# Patient Record
Sex: MALE | URBAN - METROPOLITAN AREA
[De-identification: names, ages, dates, MRNs, and addresses within clinical notes are randomized per-mention and may not be internally consistent; named-entity substitution may affect disease eponyms.]

---

## 2020-03-06 ENCOUNTER — OFFICE VISIT (OUTPATIENT)
Dept: URBAN - METROPOLITAN AREA CLINIC 72 | Facility: CLINIC | Age: 35
End: 2020-03-06
Payer: COMMERCIAL

## 2020-03-06 DIAGNOSIS — H52.13 MYOPIA, BILATERAL: Primary | ICD-10-CM

## 2020-03-06 PROCEDURE — 92002 INTRM OPH EXAM NEW PATIENT: CPT | Performed by: OPTOMETRIST

## 2020-03-06 ASSESSMENT — VISUAL ACUITY
OS: 20/20
OD: 20/20

## 2021-09-23 ENCOUNTER — OFFICE VISIT (OUTPATIENT)
Dept: URBAN - METROPOLITAN AREA CLINIC 72 | Facility: CLINIC | Age: 36
End: 2021-09-23
Payer: COMMERCIAL

## 2021-09-23 PROCEDURE — 92310 CONTACT LENS FITTING OU: CPT | Performed by: OPTOMETRIST

## 2021-09-23 PROCEDURE — 92012 INTRM OPH EXAM EST PATIENT: CPT | Performed by: OPTOMETRIST

## 2021-09-23 ASSESSMENT — INTRAOCULAR PRESSURE
OD: 23
OS: 23

## 2021-09-23 ASSESSMENT — VISUAL ACUITY
OS: 20/20
OD: 20/20

## 2021-09-23 NOTE — IMPRESSION/PLAN
Impression: Myopia, bilateral: H52.13. Plan: New spec Rx given - Discussed changes and possible adaptation period. 

CL RX and trials given today in clinic

## 2024-05-10 ENCOUNTER — OFFICE VISIT (OUTPATIENT)
Dept: URBAN - METROPOLITAN AREA CLINIC 72 | Facility: CLINIC | Age: 39
End: 2024-05-10
Payer: COMMERCIAL

## 2024-05-10 DIAGNOSIS — H52.13 MYOPIA, BILATERAL: Primary | ICD-10-CM

## 2024-05-10 PROCEDURE — 92014 COMPRE OPH EXAM EST PT 1/>: CPT | Performed by: OPTOMETRIST

## 2024-05-10 ASSESSMENT — VISUAL ACUITY
OD: 20/20
OS: 20/20

## 2024-05-10 ASSESSMENT — KERATOMETRY
OS: 44.00
OD: 44.13

## 2024-05-10 ASSESSMENT — INTRAOCULAR PRESSURE
OS: 20
OD: 20